# Patient Record
Sex: FEMALE | Race: WHITE | Employment: PART TIME | URBAN - METROPOLITAN AREA
[De-identification: names, ages, dates, MRNs, and addresses within clinical notes are randomized per-mention and may not be internally consistent; named-entity substitution may affect disease eponyms.]

---

## 2017-08-25 ENCOUNTER — OFFICE VISIT (OUTPATIENT)
Dept: INTERNAL MEDICINE CLINIC | Facility: CLINIC | Age: 47
End: 2017-08-25

## 2017-08-25 ENCOUNTER — LAB ENCOUNTER (OUTPATIENT)
Dept: LAB | Age: 47
End: 2017-08-25
Attending: INTERNAL MEDICINE
Payer: COMMERCIAL

## 2017-08-25 ENCOUNTER — HOSPITAL ENCOUNTER (OUTPATIENT)
Dept: GENERAL RADIOLOGY | Facility: HOSPITAL | Age: 47
Discharge: HOME | End: 2017-08-25
Attending: INTERNAL MEDICINE
Payer: COMMERCIAL

## 2017-08-25 VITALS
HEIGHT: 64 IN | WEIGHT: 168 LBS | BODY MASS INDEX: 28.68 KG/M2 | HEART RATE: 81 BPM | TEMPERATURE: 98 F | DIASTOLIC BLOOD PRESSURE: 79 MMHG | SYSTOLIC BLOOD PRESSURE: 118 MMHG

## 2017-08-25 DIAGNOSIS — M75.82 BONE SPUR OF LEFT ACROMIOCLAVICULAR JOINT: ICD-10-CM

## 2017-08-25 DIAGNOSIS — Z00.00 PHYSICAL EXAM: ICD-10-CM

## 2017-08-25 DIAGNOSIS — Z00.00 PHYSICAL EXAM: Primary | ICD-10-CM

## 2017-08-25 DIAGNOSIS — R10.32 LLQ PAIN: ICD-10-CM

## 2017-08-25 DIAGNOSIS — Z00.00 ROUTINE GENERAL MEDICAL EXAMINATION AT A HEALTH CARE FACILITY: Primary | ICD-10-CM

## 2017-08-25 LAB
CRP SERPL-MCNC: 0.9 MG/DL (ref 0–0.9)
ERYTHROCYTE [SEDIMENTATION RATE] IN BLOOD: 13 MM/HR (ref 0–20)
RHEUMATOID FACT SER QL: <5 IU/ML

## 2017-08-25 PROCEDURE — 36415 COLL VENOUS BLD VENIPUNCTURE: CPT

## 2017-08-25 PROCEDURE — 74000 XR ABDOMEN (1 VIEW) (CPT=74000): CPT | Performed by: INTERNAL MEDICINE

## 2017-08-25 PROCEDURE — 86200 CCP ANTIBODY: CPT

## 2017-08-25 PROCEDURE — 99396 PREV VISIT EST AGE 40-64: CPT | Performed by: INTERNAL MEDICINE

## 2017-08-25 PROCEDURE — 86038 ANTINUCLEAR ANTIBODIES: CPT

## 2017-08-25 PROCEDURE — 86431 RHEUMATOID FACTOR QUANT: CPT

## 2017-08-25 PROCEDURE — 85652 RBC SED RATE AUTOMATED: CPT

## 2017-08-25 PROCEDURE — 86140 C-REACTIVE PROTEIN: CPT

## 2017-08-25 RX ORDER — VITAMIN A 10000 UNIT
1 TABLET ORAL DAILY
COMMUNITY
End: 2018-12-19

## 2017-08-25 NOTE — PROGRESS NOTES
Sylvia Connors is a 42year old female. Patient presents with:  Physical  Abdominal Pain: left lower, since april/may.  some diarrhea on/off   Joint Pain: past few years  Toe Pain: left big toe       HPI:   Pt is a 43 yo woman comes for a physical;   C/c ph lesions or rashes  RESPIRATORY: denies shortness of breath, cough, wheezing  CARDIOVASCULAR: denies chest pain on exertion, palpitations,+ swelling in feet- R>L -- saw a vein specialist and has variscoe veins on the left   GI: + abdominal pain and denies h plenty of fluids that she has admitted that she is not drinking a lot  ?  Kid stones     Preventative med  Pap - may 2017 normal [facility] scan   Mammogram - may normal [facility] lscan   labs 5/17 reviewed -- will scan   F/u dr Yue Robertson         The patient indicates und

## 2017-08-26 DIAGNOSIS — N20.0 KIDNEY STONES: Primary | ICD-10-CM

## 2017-08-28 ENCOUNTER — TELEPHONE (OUTPATIENT)
Dept: OTHER | Age: 47
End: 2017-08-28

## 2017-08-28 NOTE — TELEPHONE ENCOUNTER
LMTCB please transfer to triage. Thanks      Notes Recorded by Jonathan Flores MD on 8/26/2017 at 1:55 PM CDT  Please call patient and her know that the [place] [facility] shows she may have a kidney stone but it is on the right side.  It is less than 10mm so it may pass

## 2017-08-29 LAB
ANA SER QL: NEGATIVE
CCP IGG SERPL-ACNC: 1.2 U/ML (ref 0–6.9)

## 2017-09-27 ENCOUNTER — TELEPHONE (OUTPATIENT)
Dept: FAMILY MEDICINE CLINIC | Facility: CLINIC | Age: 47
End: 2017-09-27

## 2018-01-04 ENCOUNTER — LAB SERVICES (OUTPATIENT)
Dept: OTHER | Age: 48
End: 2018-01-04

## 2018-01-04 ENCOUNTER — CHARTING TRANS (OUTPATIENT)
Dept: OTHER | Age: 48
End: 2018-01-04

## 2018-01-04 LAB — RAPID STREP GROUP A: POSITIVE

## 2018-05-03 ENCOUNTER — PRIOR ORIGINAL RECORDS (OUTPATIENT)
Dept: OTHER | Age: 48
End: 2018-05-03

## 2018-05-16 ENCOUNTER — MED REC SCAN ONLY (OUTPATIENT)
Dept: INTERNAL MEDICINE CLINIC | Facility: CLINIC | Age: 48
End: 2018-05-16

## 2018-06-01 ENCOUNTER — PRIOR ORIGINAL RECORDS (OUTPATIENT)
Dept: OTHER | Age: 48
End: 2018-06-01

## 2018-06-18 ENCOUNTER — MED REC SCAN ONLY (OUTPATIENT)
Dept: INTERNAL MEDICINE CLINIC | Facility: CLINIC | Age: 48
End: 2018-06-18

## 2018-06-28 ENCOUNTER — MYAURORA ACCOUNT LINK (OUTPATIENT)
Dept: OTHER | Age: 48
End: 2018-06-28

## 2018-06-28 ENCOUNTER — PRIOR ORIGINAL RECORDS (OUTPATIENT)
Dept: OTHER | Age: 48
End: 2018-06-28

## 2018-07-12 ENCOUNTER — MED REC SCAN ONLY (OUTPATIENT)
Dept: INTERNAL MEDICINE CLINIC | Facility: CLINIC | Age: 48
End: 2018-07-12

## 2018-11-02 VITALS
SYSTOLIC BLOOD PRESSURE: 110 MMHG | TEMPERATURE: 98.3 F | OXYGEN SATURATION: 97 % | DIASTOLIC BLOOD PRESSURE: 70 MMHG | HEART RATE: 98 BPM | WEIGHT: 155 LBS | BODY MASS INDEX: 26.46 KG/M2 | HEIGHT: 64 IN | RESPIRATION RATE: 16 BRPM

## 2018-12-19 ENCOUNTER — TELEPHONE (OUTPATIENT)
Dept: NEUROLOGY | Facility: CLINIC | Age: 48
End: 2018-12-19

## 2018-12-19 ENCOUNTER — OFFICE VISIT (OUTPATIENT)
Dept: NEUROLOGY | Facility: CLINIC | Age: 48
End: 2018-12-19
Payer: COMMERCIAL

## 2018-12-19 VITALS
HEART RATE: 88 BPM | DIASTOLIC BLOOD PRESSURE: 64 MMHG | HEIGHT: 64.5 IN | SYSTOLIC BLOOD PRESSURE: 130 MMHG | BODY MASS INDEX: 27.83 KG/M2 | WEIGHT: 165 LBS | RESPIRATION RATE: 16 BRPM

## 2018-12-19 DIAGNOSIS — M67.912 DYSFUNCTION OF LEFT ROTATOR CUFF: ICD-10-CM

## 2018-12-19 DIAGNOSIS — M54.12 CERVICAL RADICULOPATHY AT C7: ICD-10-CM

## 2018-12-19 DIAGNOSIS — M75.112 INCOMPLETE TEAR OF LEFT ROTATOR CUFF: Primary | ICD-10-CM

## 2018-12-19 DIAGNOSIS — M25.512 ACUTE PAIN OF LEFT SHOULDER: ICD-10-CM

## 2018-12-19 DIAGNOSIS — M48.061 LUMBAR FORAMINAL STENOSIS: ICD-10-CM

## 2018-12-19 DIAGNOSIS — M54.2 NECK PAIN ON LEFT SIDE: ICD-10-CM

## 2018-12-19 DIAGNOSIS — M75.112 INCOMPLETE TEAR OF LEFT ROTATOR CUFF: ICD-10-CM

## 2018-12-19 DIAGNOSIS — M48.061 SPINAL STENOSIS OF LUMBAR REGION WITHOUT NEUROGENIC CLAUDICATION: ICD-10-CM

## 2018-12-19 DIAGNOSIS — M79.675 GREAT TOE PAIN, LEFT: ICD-10-CM

## 2018-12-19 DIAGNOSIS — M22.2X2 PATELLOFEMORAL SYNDROME OF LEFT KNEE: ICD-10-CM

## 2018-12-19 PROCEDURE — 76881 US COMPL JOINT R-T W/IMG: CPT | Performed by: PHYSICAL MEDICINE & REHABILITATION

## 2018-12-19 PROCEDURE — 99204 OFFICE O/P NEW MOD 45 MIN: CPT | Performed by: PHYSICAL MEDICINE & REHABILITATION

## 2018-12-19 RX ORDER — ALBUTEROL SULFATE 90 UG/1
2 AEROSOL, METERED RESPIRATORY (INHALATION) EVERY 4 HOURS PRN
COMMUNITY

## 2018-12-19 NOTE — TELEPHONE ENCOUNTER
Called [facility] for Authorization Approval of Ultrasound Left Shoulder under CPT Code 88130/50012. Spoke to [employer] and some sugar who stated coverage is based on medical necessity, but, no prior authorization is required under  Ref #[id number].  Will inform nurses    Chano Vivar

## 2018-12-19 NOTE — PROCEDURES
I did an ultrasound examination of the left shoulder in the office today:   Biceps Tendon:   Normal   Subscapularis Tendon:  Mild-moderate bursal surface partial thickness tear  AC Joint:   Normal  Impingement Sign:  Normal  Subdeltoid Bursa:  Normal  Post

## 2018-12-19 NOTE — PROGRESS NOTES
Chief Complaint:  Patient presents with:  Shoulder Pain: Patient presents for left shoulder and neck pain for past 2 months, last seen in 2014.  Notices good posture makes pain better, rated pain a 2/10, done PT in the past in 2014 w/ relief, ibuprofen otc of cooking and preparing to do for it. The pain started in the left superior and lateral aspect and the following day the pain was in the inferior and lateral aspect of the knee. The pain continued to increase during the day.   The next day, she started w worry: Not on file      Food insecurity - inability: Not on file      Transportation needs - medical: Not on file      Transportation needs - non-medical: Not on file    Occupational History      Not on file    Tobacco Use      Smoking status: Never Smoker in her stated age in no distress. The patient is well groomed. Psychiatric:  The patient is alert and oriented x 3. The patient has a normal affect and mood. Respiratory:  No acute respiratory distress. Patient does not have a cough.     HEENT:  ANISH Negative  Left Neers test: Positive  Right external rotation: Negative  Left external rotation: Negative     Gait:    Gait: Normal gait with left foot slightly externally rotated and questionable slight left foot flop   Sit to Stand: no difficulty   RIGHT test Negative for pain   LEFT hip piriformis stretch test Negative for pain     Neural Tension Tests Lumbar Spine:  Supine straight leg raise-RIGHT Negative for pain   Supine straight leg raise-LEFT Negative for pain     Lymph Nodes:   Inguinal Lymph Nodes

## 2019-01-02 PROBLEM — M48.061 SPINAL STENOSIS OF LUMBAR REGION WITHOUT NEUROGENIC CLAUDICATION: Status: ACTIVE | Noted: 2019-01-02

## 2019-01-02 PROBLEM — M48.061 LUMBAR FORAMINAL STENOSIS: Status: ACTIVE | Noted: 2019-01-02

## 2019-01-02 PROBLEM — M48.061 SPINAL STENOSIS OF LUMBAR REGION WITHOUT NEUROGENIC CLAUDICATION: Status: RESOLVED | Noted: 2019-01-02 | Resolved: 2019-01-02

## 2019-01-02 PROBLEM — M75.112 INCOMPLETE TEAR OF LEFT ROTATOR CUFF: Status: ACTIVE | Noted: 2019-01-02

## 2019-01-02 PROBLEM — M48.061 LUMBAR FORAMINAL STENOSIS: Status: RESOLVED | Noted: 2019-01-02 | Resolved: 2019-01-02

## 2019-02-28 VITALS
OXYGEN SATURATION: 98 % | HEIGHT: 64 IN | BODY MASS INDEX: 29.19 KG/M2 | WEIGHT: 171 LBS | HEART RATE: 77 BPM | SYSTOLIC BLOOD PRESSURE: 122 MMHG | DIASTOLIC BLOOD PRESSURE: 70 MMHG

## 2019-02-28 VITALS
DIASTOLIC BLOOD PRESSURE: 70 MMHG | WEIGHT: 174 LBS | HEIGHT: 64 IN | SYSTOLIC BLOOD PRESSURE: 120 MMHG | OXYGEN SATURATION: 99 % | BODY MASS INDEX: 29.71 KG/M2 | HEART RATE: 84 BPM

## 2020-01-10 ENCOUNTER — IMMUNIZATION (OUTPATIENT)
Dept: FAMILY MEDICINE CLINIC | Facility: CLINIC | Age: 50
End: 2020-01-10
Payer: COMMERCIAL

## 2020-01-10 DIAGNOSIS — Z23 NEED FOR VACCINATION: ICD-10-CM

## 2020-01-10 PROCEDURE — 90471 IMMUNIZATION ADMIN: CPT | Performed by: PHYSICIAN ASSISTANT

## 2020-01-10 PROCEDURE — 90686 IIV4 VACC NO PRSV 0.5 ML IM: CPT | Performed by: PHYSICIAN ASSISTANT

## 2020-07-12 ENCOUNTER — APPOINTMENT (OUTPATIENT)
Dept: GENERAL RADIOLOGY | Facility: HOSPITAL | Age: 50
End: 2020-07-12
Attending: EMERGENCY MEDICINE
Payer: COMMERCIAL

## 2020-07-12 ENCOUNTER — HOSPITAL ENCOUNTER (EMERGENCY)
Facility: HOSPITAL | Age: 50
Discharge: HOME | End: 2020-07-12
Attending: EMERGENCY MEDICINE
Payer: COMMERCIAL

## 2020-07-12 VITALS
HEART RATE: 91 BPM | WEIGHT: 165 LBS | TEMPERATURE: 98 F | DIASTOLIC BLOOD PRESSURE: 78 MMHG | RESPIRATION RATE: 16 BRPM | SYSTOLIC BLOOD PRESSURE: 148 MMHG | BODY MASS INDEX: 28.17 KG/M2 | HEIGHT: 64 IN | OXYGEN SATURATION: 98 %

## 2020-07-12 DIAGNOSIS — S93.402A SPRAIN OF LEFT ANKLE, UNSPECIFIED LIGAMENT, INITIAL ENCOUNTER: Primary | ICD-10-CM

## 2020-07-12 PROCEDURE — 73610 X-RAY EXAM OF ANKLE: CPT | Performed by: EMERGENCY MEDICINE

## 2020-07-12 PROCEDURE — 99283 EMERGENCY DEPT VISIT LOW MDM: CPT

## 2020-07-13 NOTE — ED INITIAL ASSESSMENT (HPI)
Patient states that she was playing Spontaneously when she tripped over [place] and fell. Patient has c/o of left ankle pain. CMS intact. Patient has ace wrap PTA. Patient states she took 2 Advil PTA.  Patient denies head injury, denies loc, denies blood thinner

## 2020-07-13 NOTE — ED NOTES
Discharge instructions reviewed. Pt verbalized understanding with no further questions. Pain controlled. Steady gait. Speaking in full clear sentences at discharge. Spouse present at bedside for discharge instructions.

## 2020-07-13 NOTE — ED PROVIDER NOTES
Patient Seen in: [facility] AND [facility] Emergency Department      History   Patient presents with:   Ankle Injury    Stated Complaint: ankle injury    HPI    44-year-old female without significant past medical history presents with complaints of left ankle morgan and rhythm  Musculoskeletal: Tenderness to palpation of the lateral malleolus of the left ankle. No medial malleolar tenderness noted. No foot tenderness noted. No proximal leg tenderness noted. No knee tenderness noted.   Bilateral dorsalis pedis pulse

## 2020-10-12 ENCOUNTER — OFFICE VISIT (OUTPATIENT)
Dept: INTERNAL MEDICINE CLINIC | Facility: CLINIC | Age: 50
End: 2020-10-12
Payer: COMMERCIAL

## 2020-10-12 ENCOUNTER — LAB ENCOUNTER (OUTPATIENT)
Dept: LAB | Age: 50
End: 2020-10-12
Attending: INTERNAL MEDICINE
Payer: COMMERCIAL

## 2020-10-12 VITALS
SYSTOLIC BLOOD PRESSURE: 138 MMHG | HEART RATE: 80 BPM | HEIGHT: 64 IN | BODY MASS INDEX: 29.88 KG/M2 | TEMPERATURE: 98 F | WEIGHT: 175 LBS | DIASTOLIC BLOOD PRESSURE: 80 MMHG

## 2020-10-12 DIAGNOSIS — R10.32 LLQ PAIN: Primary | ICD-10-CM

## 2020-10-12 DIAGNOSIS — R10.84 GENERALIZED ABDOMINAL PAIN: ICD-10-CM

## 2020-10-12 DIAGNOSIS — R10.32 LLQ PAIN: ICD-10-CM

## 2020-10-12 PROCEDURE — 83690 ASSAY OF LIPASE: CPT

## 2020-10-12 PROCEDURE — 84443 ASSAY THYROID STIM HORMONE: CPT

## 2020-10-12 PROCEDURE — 80053 COMPREHEN METABOLIC PANEL: CPT

## 2020-10-12 PROCEDURE — 99214 OFFICE O/P EST MOD 30 MIN: CPT | Performed by: INTERNAL MEDICINE

## 2020-10-12 PROCEDURE — 3079F DIAST BP 80-89 MM HG: CPT | Performed by: INTERNAL MEDICINE

## 2020-10-12 PROCEDURE — 36415 COLL VENOUS BLD VENIPUNCTURE: CPT

## 2020-10-12 PROCEDURE — 82150 ASSAY OF AMYLASE: CPT

## 2020-10-12 PROCEDURE — 85025 COMPLETE CBC W/AUTO DIFF WBC: CPT

## 2020-10-12 PROCEDURE — 3008F BODY MASS INDEX DOCD: CPT | Performed by: INTERNAL MEDICINE

## 2020-10-12 PROCEDURE — 3075F SYST BP GE 130 - 139MM HG: CPT | Performed by: INTERNAL MEDICINE

## 2020-10-12 NOTE — PROGRESS NOTES
Neville Cross is a 52year old female. Patient presents with:  Abdominal Pain: left lower abdominal pain ,contant dull pain 3-4/10 x on and off for for a years seems related to travel . ,does have bouts of constipation and or loose stool       HPI:   Pt co tobacco: Never Used    Alcohol use:  Yes      Alcohol/week: 0.8 standard drinks      Types: 1 Glasses of wine per week      Comment: 1 drink, monthly    Drug use: No       REVIEW OF SYSTEMS:   GENERAL HEALTH: No fevers, chills, sweats, fatigue  RESPIRATORY: refer her to the GI doctor since this has been going on for so long  Continue to take the yogurt and if needed take a probiotic  Asked her to bring back the results of her pelvic ultrasound  Although order did ask her to hold off on the pelvic ultrasound a

## 2024-05-31 ENCOUNTER — TELEPHONE (OUTPATIENT)
Facility: CLINIC | Age: 54
End: 2024-05-31

## 2024-05-31 ENCOUNTER — OFFICE VISIT (OUTPATIENT)
Facility: CLINIC | Age: 54
End: 2024-05-31

## 2024-05-31 VITALS
BODY MASS INDEX: 30.16 KG/M2 | DIASTOLIC BLOOD PRESSURE: 83 MMHG | SYSTOLIC BLOOD PRESSURE: 133 MMHG | WEIGHT: 181 LBS | HEART RATE: 75 BPM | HEIGHT: 65 IN

## 2024-05-31 DIAGNOSIS — Z12.11 COLON CANCER SCREENING: Primary | ICD-10-CM

## 2024-05-31 DIAGNOSIS — Z12.11 SCREEN FOR COLON CANCER: Primary | ICD-10-CM

## 2024-05-31 PROCEDURE — 3008F BODY MASS INDEX DOCD: CPT | Performed by: INTERNAL MEDICINE

## 2024-05-31 PROCEDURE — 3079F DIAST BP 80-89 MM HG: CPT | Performed by: INTERNAL MEDICINE

## 2024-05-31 PROCEDURE — 3075F SYST BP GE 130 - 139MM HG: CPT | Performed by: INTERNAL MEDICINE

## 2024-05-31 RX ORDER — SODIUM, POTASSIUM,MAG SULFATES 17.5-3.13G
SOLUTION, RECONSTITUTED, ORAL ORAL
Qty: 1 EACH | Refills: 0 | Status: SHIPPED | OUTPATIENT
Start: 2024-05-31

## 2024-05-31 NOTE — H&P
[facility] - Gastroenterology                                                                                                               Reason for consult: Screening    Requesting physician or provider: Connie Gutierrez MD    Chief Complaint   Patient presents with    Colonoscopy Screening     No previous CLN        HPI:   Anahi Gr is a 53 year old year-old female with history of palpitations, who presents for screening.    -Patient currently denies any GI symptoms of nausea, vomiting, dyspepsia, dysphagia, hematemesis, abdominal pain, change in bowel habits, thin stools, hematochezia, or melena.  Additionally there is no weight loss and no reported history of chest pain or shortness of breath.  -No family hx of GI cancer  -Mom has diverticulitis       Prior endoscopies:  none    Soc:  -no smoking  -no Etoh    Wt Readings from Last 6 Encounters:   05/31/24 181 lb (82.1 kg)   10/12/20 175 lb (79.4 kg)   07/12/20 165 lb (74.8 kg)   12/19/18 165 lb (74.8 kg)   08/25/17 168 lb (76.2 kg)   10/16/14 157 lb 6.4 oz (71.4 kg)        History, Medications, Allergies, ROS:      Past Medical History:    Esophageal reflux    food gets stuck when under a lot of stress     Extrinsic asthma, unspecified    Palpitations    PVCs (premature ventricular contractions)      Past Surgical History:   Procedure Laterality Date    Electrocardiogram, complete  8-    scanned to media tab      Family Hx:   Family History   Problem Relation Age of Onset    Hypertension Father     Heart Disease Father         CAD    Heart Surgery Father         cabg    Hypertension Mother     Clotting Disorder Brother       Social History:   Social History     Socioeconomic History    Marital status:    Tobacco Use    Smoking status: Never    Smokeless tobacco: Never   Vaping Use    Vaping status: Never Used   Substance and Sexual Activity    Alcohol  use: Yes     Alcohol/week: 0.8 standard drinks of alcohol     Types: 1 Glasses of wine per week     Comment: 1 drink, monthly    Drug use: No   Other Topics Concern    Caffeine Concern No    Exercise No   Social History Narrative    The patient does not use an assistive device..      The patient does live in a home with stairs.        Medications (Active prior to today's visit):  Current Outpatient Medications   Medication Sig Dispense Refill    Na Sulfate-K Sulfate-Mg Sulf (SUPREP BOWEL PREP KIT) 17.5-3.13-1.6 GM/177ML Oral Solution Take prep as directed by gastro office. May substitute with Trilyte/generic equivalent if needed. 1 each 0    Multiple Vitamins-Minerals (MULTIVITAMIN ADULT OR) Take by mouth daily.      Albuterol Sulfate  (90 Base) MCG/ACT Inhalation Aero Soln Inhale 2 puffs into the lungs every 4 (four) hours as needed for Wheezing.         Allergies:  No Known Allergies    ROS:   CONSTITUTIONAL:  negative for fevers, rigors  EYES:  negative for diplopia   RESPIRATORY:  negative for severe shortness of breath  CARDIOVASCULAR:  negative for crushing sub-sternal chest pain  GASTROINTESTINAL:  see HPI  GENITOURINARY:  negative for dysuria or gross hematuria  INTEGUMENT/BREAST:  SKIN:  negative for jaundice   ALLERGIC/IMMUNOLOGIC:  negative for hay fever  ENDOCRINE:  negative for cold intolerance and heat intolerance  MUSCULOSKELETAL:  negative for joint effusion/severe erythema  BEHAVIOR/PSYCH:  negative for psychotic behavior      PHYSICAL EXAM:   Blood pressure 133/83, pulse 75, height 5' 5\" (1.651 m), weight 181 lb (82.1 kg).    Gen- Patient appears comfortable and in no acute discomfort  HEENT: the sclera appears anicteric, oropharynx clear, mucus membranes appear moist  CV- regular rate and rhythm, the extremities are warm and well perfused   Lung- Moves air well; No labored breathing  Abdomen- soft, non-tender exam in all quadrants without rigidity or guarding, non-distended, no abnormal  bowel sounds noted, no masses are palpated  Skin- No jaundice  Ext: no cyanosis, clubbing or edema is evident.   Neuro- Alert and interactive, and gross movements of extremities normal  Psych - appropriate, non-agitated    Labs/Imaging:     Patient's pertinent labs and imaging were reviewed and discussed with patient today.      ASSESSMENT/PLAN:   Anahi Gr is a 53 year old year-old female with history of palpitations, who presents for screening.    #Hx of PVCs- fully evaluated few years ago, saw a cardiologist, had testing, reassured her nothing more needs to be done.    # Average Risk screening: patient is considered average risk for colon cancer (no family hx of colon cancer) and it is appropriate to proceed with screening colonoscopy. Patient is currently asymptomatic and denies diarrhea, hematochezia, thin-stools or weight loss. We discussed risks/benefits/alternatives to procedure, including CT colonography and stool testing, they want to proceed with colonoscopy.    Recommendations:    1. Schedule colonoscopy with MAC [Diagnosis: screening]    2.  bowel prep from pharmacy ([facility])  -Buy over the counter dulcolax laxative, and take one tablet daily for 3 days prior to drinking the bowel prep.     3. Continue all medications as normal for your procedure.    4. Read all bowel prep instructions carefully. Bowel prep instructions can also be found online at:  [email or web address]     5. AVOID seeds, nuts, popcorn, raw fruits and vegetables for 3 days before procedure    6. You MAY need to go for COVID testing 72 hours before procedure. The testing team will call you a few days before your procedure to discuss with you if testing is required. If you are asked to go for COVID testing and do not completed the test, the procedure cannot be performed.     7. If you start any NEW medication after your visit today, please notify us. Certain medications (like iron or weight loss medications) will need  to be held before the procedure, or the procedure cannot be performed safely.        Colonoscopy consent: I have discussed the risks, benefits, and alternatives to colonoscopy with the patient/primary decision maker [who demonstrated understanding], including but not limited to the risks of bleeding, infection, pain, death, as well as the risks of anesthesia and perforation all leading to prolonged hospitalization, surgical intervention, or even death. I also specifically mentioned the miss rate of colonoscopy of 5-10% in the best of all circumstances. The patient has agreed to sign an informed consent and elected to proceed with colonoscopy with possible intervention [i.e. polypectomy, stent placement, etc.] as indicated.          Orders This Visit:  No orders of the defined types were placed in this encounter.      Meds This Visit:  Requested Prescriptions     Signed Prescriptions Disp Refills    Na Sulfate-K Sulfate-Mg Sulf (SUPREP BOWEL PREP KIT) 17.5-3.13-1.6 GM/177ML Oral Solution 1 each 0     Sig: Take prep as directed by gastro office. May substitute with Trilyte/generic equivalent if needed.       Imaging & Referrals:  None         LUIS Sherman MD  Pager: [phone]  5/31/2024        This note was partially prepared using Dragon Medical voice recognition dictation software. As a result, errors may occur. When identified, these errors have been corrected. While every attempt is made to correct errors during dictation, discrepancies may still exist.

## 2024-05-31 NOTE — TELEPHONE ENCOUNTER
Patient was seen in office today with Dr. Sherman  and was given orders to schedule. 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 She said is available from June 21st- July 7th and August 19- September 13 for the procedure. Otherwise she can not have her Colonoscopy until January. I also informed Dr Sherman of the conflict in the patient's schedule and the procedure. He verbalized understanding.    I told her we will give her a call if we have any dates open up that works best for her during the time frame she had given. She verbalized understanding.      Patient was given the phone number and prep instructions at the time of the office visit. If patient calls please schedule with the orders given below, thank you!    Orders from Dr. Sherman      1. Schedule colonoscopy with [facility] [Diagnosis: screening]     2.  bowel prep from pharmacy ([facility])  -Buy over the counter dulcolax laxative, and take one tablet daily for 3 days prior to drinking the bowel prep.      3. Continue all medications as normal for your procedure.     4. Read all bowel prep instructions carefully. Bowel prep instructions can also be found online at:  [email or web address]      5. AVOID seeds, nuts, popcorn, raw fruits and vegetables for 3 days before procedure     6. You MAY need to go for COVID testing 72 hours before procedure. The testing team will call you a few days before your procedure to discuss with you if testing is required. If you are asked to go for COVID testing and do not completed the test, the procedure cannot be performed.      7. If you start any NEW medication after your visit today, please notify us. Certain medications (like iron or weight loss medications) will need to be held before the procedure, or the procedure cannot be performed safely.

## 2024-05-31 NOTE — PATIENT INSTRUCTIONS
1. Schedule colonoscopy with [facility] [Diagnosis: screening]    2.  bowel prep from pharmacy ([facility])  -Buy over the counter dulcolax laxative, and take one tablet daily for 3 days prior to drinking the bowel prep.     3. Continue all medications as normal for your procedure.    4. Read all bowel prep instructions carefully. Bowel prep instructions can also be found online at:  [email or web address]     5. AVOID seeds, nuts, popcorn, raw fruits and vegetables for 3 days before procedure    6. You MAY need to go for COVID testing 72 hours before procedure. The testing team will call you a few days before your procedure to discuss with you if testing is required. If you are asked to go for COVID testing and do not completed the test, the procedure cannot be performed.     7. If you start any NEW medication after your visit today, please notify us. Certain medications (like iron or weight loss medications) will need to be held before the procedure, or the procedure cannot be performed safely.

## 2024-06-10 NOTE — TELEPHONE ENCOUNTER
Scheduled for:  Colonoscopy [id number]  Provider Name:  Dr. Sherman   Date:  06/24/2024   Location:[facility]  Sedation:  MAC  Time: 1:30pm (pt is aware that [facility] will call the day before to confirm arrival time)    Prep:  Trilyte Prep Instructions Given At The Office Visit.    Meds/Allergies Reconciled?:  Physician Reviewed   Diagnosis with codes:  Colon Screening Z12.11  Was patient informed to call insurance with codes (Y/N):  Yes  Referral sent?:  Referral was sent at the time of electronic surgical scheduling.  [facility] or [facility] notified?:  I sent an electronic request to Endo Scheduling and received a confirmation today.  Medication Orders:  Pt is aware to NOT take iron pills, herbal meds and diet supplements for 7 days before exam. Also to NOT take any form of alcohol, recreational drugs and any forms of ED meds 24 hours before exam.   Misc Orders:       Further instructions given by staff:  I provide prep instructions to patient at the time of the appointment and reviewed date, time and location, she verbalized that she understood and is aware to call if she has any questions.    Patient was informed about the new cancellation policy for his/her procedure. Patient was also given a copy of the cancellation policy at the time of the appointment and verbalized understanding.

## 2024-06-24 PROBLEM — K63.5 POLYP OF ASCENDING COLON: Status: ACTIVE | Noted: 2024-06-24

## 2024-06-24 PROCEDURE — 88305 TISSUE EXAM BY PATHOLOGIST: CPT | Performed by: INTERNAL MEDICINE

## 2024-07-12 ENCOUNTER — TELEPHONE (OUTPATIENT)
Facility: CLINIC | Age: 54
End: 2024-07-12

## 2024-07-12 NOTE — TELEPHONE ENCOUNTER
I mailed out colonoscopy results letter to pt  Updated health maintenance  Entered into 5 yr CLN recall  Recall colon in 5 years per. Colon done 6/24/2024      KARLA Sherman MD  [facility] Clinical Staff  GI staff: please place recall for colonoscopy in 5 years

## 2025-03-17 ENCOUNTER — TELEPHONE (OUTPATIENT)
Dept: INTERNAL MEDICINE CLINIC | Facility: CLINIC | Age: 55
End: 2025-03-17

## 2025-03-17 NOTE — TELEPHONE ENCOUNTER
Patient called to request an appointment for a Pre-Op, DOS is 4/11, next available is in July.

## 2025-04-02 ENCOUNTER — OFFICE VISIT (OUTPATIENT)
Dept: INTERNAL MEDICINE CLINIC | Facility: CLINIC | Age: 55
End: 2025-04-02

## 2025-04-02 VITALS
OXYGEN SATURATION: 100 % | TEMPERATURE: 98 F | RESPIRATION RATE: 16 BRPM | HEART RATE: 99 BPM | BODY MASS INDEX: 31.22 KG/M2 | DIASTOLIC BLOOD PRESSURE: 84 MMHG | WEIGHT: 187.38 LBS | HEIGHT: 65 IN | SYSTOLIC BLOOD PRESSURE: 135 MMHG

## 2025-04-02 DIAGNOSIS — Z00.00 PHYSICAL EXAM, ANNUAL: Primary | ICD-10-CM

## 2025-04-02 DIAGNOSIS — Z01.818 PREOP EXAMINATION: ICD-10-CM

## 2025-04-02 DIAGNOSIS — I47.20 PAROXYSMAL VENTRICULAR TACHYCARDIA (HCC): ICD-10-CM

## 2025-04-02 NOTE — PROGRESS NOTES
Patient comes for preop  C/C.  C/Anahi Gr is a 54 year old female.  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   Patient has never had any surgeries in the past  She states that she can walk up 2 flights of stairs without feeling short of breath  Has PVCs and has seen the cardiologist -when she walks fast or on an incline she feels   Tightness on the right side       History   2017 visit   Gyn  is at [facility]   LLQ pain x 4-5 mns -- saw gyn this past week and did pelvic usg and it was normal   This week is a little better - deep dull pain -feels full, no constipation   Had diarreah x 3 days in may - no fevers with it ,nver blood   Sometimes radiates to the back   2/10 today but can be 4-5/10   Better or Worse ?/nothing --just constantly there   aslo has mult jnt pains -- left foot big toe med aspect bone is growing   All jnts hurt and was diag with vit d def - last one in may was ok and takes otc vit d   Not more in the am          PMH  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      Albuterol Sulfate  (90 Base) MCG/ACT Inhalation Aero Soln Inhale 2 puffs into the lungs every 4 (four) hours as needed for Wheezing.      Multiple Vitamins-Minerals (MULTIVITAMIN ADULT OR) Take by mouth daily. (Patient not taking: Reported on 4/2/2025)        Past Medical History:    Colon polyps    repeat CLN in 2029    Diverticulosis    on c-scope, R sided    Esophageal reflux    food gets stuck when under a lot of stress     Extrinsic asthma, unspecified    Palpitations    PVCs (premature ventricular contractions)      Past Surgical History:   Procedure Laterality Date     Colonoscopy N/A 6/24/2024    Procedure: COLONOSCOPY;  Surgeon: KARLA Sherman MD;  Location: [facility] MAIN OR    Electrocardiogram, complete  8-    scanned to media tab      Social History:  Social History     Socioeconomic History    Marital status:    Tobacco Use    Smoking status: Never    Smokeless tobacco: Never   Vaping Use    Vaping status: Never Used   Substance and Sexual Activity    Alcohol use: Not Currently     Alcohol/week: 0.8 standard drinks of alcohol     Types: 1 Glasses of wine per week     Comment: 1 drink, monthly    Drug use: No   Other Topics Concern    Caffeine Concern No    Exercise No   Social History Narrative    The patient does not use an assistive device..      The patient does live in a home with stairs.     Social Drivers of Health     Food Insecurity: No Food Insecurity (4/2/2025)    NCSS - Food Insecurity     Worried About Running Out of Food in the Last Year: No     Ran Out of Food in the Last Year: No   Transportation Needs: No Transportation Needs (4/2/2025)    NCSS - Transportation     Lack of Transportation: No   Housing Stability: Not At Risk (4/2/2025)    NCSS - Housing/Utilities     Has Housing: Yes     Worried About Losing Housing: No     Unable to Get Utilities: No        REVIEW OF SYSTEMS:   GENERAL HEALTH: No fevers, chills, sweats, fatigue  VISION: No recent vision problems, blurry vision or double vision  HEENT: No decreased hearing ear pain nasal congestion or sore throat  SKIN: denies any unusual skin lesions or rashes  RESPIRATORY: denies shortness of breath, cough, wheezing  CARDIOVASCULAR: denies chest pain on exertion, palpitations, swelling in feet  GI: denies abdominal pain and denies heartburn, nausea or vomiting  : No Pain on urination, change in the color of urine, discharge, urinating frequently  MUS: No back pain, + joint pain, no muscle pain  NEURO: denies headaches ,+ anxiety, no depression    EXAM:   /84 (BP Location: Right arm,  Patient Position: Sitting, Cuff Size: adult)   Pulse 99   Temp 97.6 °F (36.4 °C) (Temporal)   Resp 16   Ht 5' 5\" (1.651 m)   Wt 187 lb 6.4 oz (85 kg)   LMP 01/20/2025 (Approximate)   SpO2 100%   BMI 31.18 kg/m²   GENERAL: well developed, well nourished,in no apparent distress  SKIN: no rashes,no suspicious lesions  HEENT: atraumatic, normocephalic,ears and throat are clear, no frontal or maxillary sinus tenderness, pupils equal reactive to light bilaterally,extraocular muscles intact  NECK: supple,no adenopathy,nontender   LUNGS: clear to auscultation, no wheeze  CARDIO: RRR without murmur  GI: good BS's,no masses or tenderness  EXTREMITIES: no cyanosis, or edema    ASSESSMENT AND PLAN:   Diagnoses and all orders for this visit:    Physical exam, annual  -     Comp Metabolic Panel (14); Future  -     Lipid Panel; Future  -     TSH W Reflex To Free T4; Future  -     CBC, Platelet; No Differential; Future  -     EKG 12 Lead; Future  -     Cardio Referral - Internal    Preop examination  -     Comp Metabolic Panel (14); Future  -     Lipid Panel; Future  -     TSH W Reflex To Free T4; Future  -     CBC, Platelet; No Differential; Future  -     EKG 12 Lead; Future  -     Cardio Referral - Internal    Paroxysmal ventricular tachycardia (HCC)  -     Comp Metabolic Panel (14); Future  -     Lipid Panel; Future  -     TSH W Reflex To Free T4; Future  -     CBC, Platelet; No Differential; Future  -     EKG 12 Lead; Future  -     Cardio Referral - Internal    Ordered labs and EKG and will await for the fax tomorrow from her podiatry doctor if anything else is needed  Patient with good exercise tolerance  Will refer to cardiology for symptoms of palpitations/slight chest discomfort when exerting herself-has had workup done in the past with cardiology  Underwent her colonoscopy recently without any complications                Preventative med  Pap - may 2017 and 2021 in [place] -  normal ,  Mammogram - 12/2024 normal -seen  in [place]   Colonoscopy June 2024, recall in 5 years  labs 5/17 reviewed -- will scan          The patient indicates understanding of these issues and agrees to the plan.  No follow-ups on file.

## 2025-04-04 ENCOUNTER — LAB ENCOUNTER (OUTPATIENT)
Dept: LAB | Facility: HOSPITAL | Age: 55
End: 2025-04-04
Attending: INTERNAL MEDICINE
Payer: COMMERCIAL

## 2025-04-04 ENCOUNTER — TELEPHONE (OUTPATIENT)
Dept: INTERNAL MEDICINE CLINIC | Facility: CLINIC | Age: 55
End: 2025-04-04

## 2025-04-04 DIAGNOSIS — Z00.00 PHYSICAL EXAM, ANNUAL: ICD-10-CM

## 2025-04-04 DIAGNOSIS — Z01.818 PREOP EXAMINATION: ICD-10-CM

## 2025-04-04 DIAGNOSIS — I47.20 PAROXYSMAL VENTRICULAR TACHYCARDIA (HCC): ICD-10-CM

## 2025-04-04 LAB
ALBUMIN SERPL-MCNC: 4.6 G/DL (ref 3.2–4.8)
ALBUMIN/GLOB SERPL: 1.8 {RATIO} (ref 1–2)
ALP LIVER SERPL-CCNC: 62 U/L
ALT SERPL-CCNC: 35 U/L
ANION GAP SERPL CALC-SCNC: 9 MMOL/L (ref 0–18)
AST SERPL-CCNC: 20 U/L (ref ?–34)
ATRIAL RATE: 69 BPM
BILIRUB SERPL-MCNC: 0.9 MG/DL (ref 0.3–1.2)
BUN BLD-MCNC: 13 MG/DL (ref 9–23)
BUN/CREAT SERPL: 17.1 (ref 10–20)
CALCIUM BLD-MCNC: 9.4 MG/DL (ref 8.7–10.4)
CHLORIDE SERPL-SCNC: 103 MMOL/L (ref 98–112)
CHOLEST SERPL-MCNC: 208 MG/DL (ref ?–200)
CO2 SERPL-SCNC: 28 MMOL/L (ref 21–32)
CREAT BLD-MCNC: 0.76 MG/DL
DEPRECATED RDW RBC AUTO: 45.4 FL (ref 35.1–46.3)
EGFRCR SERPLBLD CKD-EPI 2021: 94 ML/MIN/1.73M2 (ref 60–?)
ERYTHROCYTE [DISTWIDTH] IN BLOOD BY AUTOMATED COUNT: 13.3 % (ref 11–15)
FASTING PATIENT LIPID ANSWER: YES
FASTING STATUS PATIENT QL REPORTED: YES
GLOBULIN PLAS-MCNC: 2.6 G/DL (ref 2–3.5)
GLUCOSE BLD-MCNC: 95 MG/DL (ref 70–99)
HCT VFR BLD AUTO: 43.3 %
HDLC SERPL-MCNC: 64 MG/DL (ref 40–59)
HGB BLD-MCNC: 13.9 G/DL
LDLC SERPL CALC-MCNC: 128 MG/DL (ref ?–100)
MCH RBC QN AUTO: 29.6 PG (ref 26–34)
MCHC RBC AUTO-ENTMCNC: 32.1 G/DL (ref 31–37)
MCV RBC AUTO: 92.3 FL
NONHDLC SERPL-MCNC: 144 MG/DL (ref ?–130)
OSMOLALITY SERPL CALC.SUM OF ELEC: 290 MOSM/KG (ref 275–295)
P AXIS: 59 DEGREES
P-R INTERVAL: 138 MS
PLATELET # BLD AUTO: 182 10(3)UL (ref 150–450)
POTASSIUM SERPL-SCNC: 4.9 MMOL/L (ref 3.5–5.1)
PROT SERPL-MCNC: 7.2 G/DL (ref 5.7–8.2)
Q-T INTERVAL: 406 MS
QRS DURATION: 70 MS
QTC CALCULATION (BEZET): 435 MS
R AXIS: 38 DEGREES
RBC # BLD AUTO: 4.69 X10(6)UL
SODIUM SERPL-SCNC: 140 MMOL/L (ref 136–145)
T AXIS: 38 DEGREES
TRIGL SERPL-MCNC: 92 MG/DL (ref 30–149)
TSI SER-ACNC: 3.05 UIU/ML (ref 0.55–4.78)
VENTRICULAR RATE: 69 BPM
VLDLC SERPL CALC-MCNC: 16 MG/DL (ref 0–30)
WBC # BLD AUTO: 7.2 X10(3) UL (ref 4–11)

## 2025-04-04 PROCEDURE — 80053 COMPREHEN METABOLIC PANEL: CPT

## 2025-04-04 PROCEDURE — 85027 COMPLETE CBC AUTOMATED: CPT

## 2025-04-04 PROCEDURE — 80061 LIPID PANEL: CPT

## 2025-04-04 PROCEDURE — 93005 ELECTROCARDIOGRAM TRACING: CPT

## 2025-04-04 PROCEDURE — 36415 COLL VENOUS BLD VENIPUNCTURE: CPT

## 2025-04-04 PROCEDURE — 93010 ELECTROCARDIOGRAM REPORT: CPT | Performed by: INTERNAL MEDICINE

## 2025-04-04 PROCEDURE — 84443 ASSAY THYROID STIM HORMONE: CPT

## 2025-04-07 NOTE — TELEPHONE ENCOUNTER
Patient stated that she is returning your call.   Patient stated that she is seeing Dr. Billy on April 10, 2025 at 2:50 pm. Patient did call them yesterday and she was not able to get a sooner appointment.   Would you like for us to call  office on Monday to see if patient can be seen sooner?            
CODIE Amaya patient will see Dr. Billy tomorrow.    Patient was called and she stated that she can take the appointment for tomorrow at 3:30. [facility] was called and she was confirmed for tomorrow appointment.       
CODIE Negron...    Patient has appointment scheduled with Cardiology 4-10 at 2:50 pm    Called and spoke with patient and identified full name and date of birth.  Relayed Dr. Gutierrez message   
Called pt - left msg   Wanting to know which cardiologist that she be seen so that I can forward the EKG to them as her appointment is on the 10th and her surgery is scheduled for the 11th  Noted some EKG changes from the previous 1 in 2015  Can also try to be seen sooner than the 10th  
Left message to call back. Please transfer to triage. 1st attempt.    I called [facility] and they can get patient in at 3:30 tomorrow with .    
Perfect, thank you for the follow-up!  
Reviewed labs and look good , did she get the apt with cardiology ?   
Yes pl -- thank you --I also sent her a mychart msg through the [employer] note   
st elevation v1-v6

## 2025-05-06 ENCOUNTER — MED REC SCAN ONLY (OUTPATIENT)
Dept: INTERNAL MEDICINE CLINIC | Facility: CLINIC | Age: 55
End: 2025-05-06

## (undated) NOTE — LETTER
11/11/20        [address] [place] [id number]      Dear Lanie Lora,    Our records indicate that you have outstanding lab work and or testing that was ordered for you and has not yet been completed:  Orders Placed This Encounter

## (undated) NOTE — LETTER
03/28/18        [address] [place] [id number]      Dear Sharonda Boykin records indicate that you have outstanding lab work and or testing that was ordered for you and has not yet been completed:          MONIQUE, Direct Screen [E]

## (undated) NOTE — LETTER
12/28/17        [address] [place] [id number]      Dear Sukhdev Amador records indicate that you have outstanding lab work and or testing that was ordered for you and has not yet been completed:          Cyclic Citrullinate Pep.  I